# Patient Record
Sex: MALE | Race: WHITE | NOT HISPANIC OR LATINO | URBAN - METROPOLITAN AREA
[De-identification: names, ages, dates, MRNs, and addresses within clinical notes are randomized per-mention and may not be internally consistent; named-entity substitution may affect disease eponyms.]

---

## 2023-11-27 ENCOUNTER — TRANSFERRED RECORDS (OUTPATIENT)
Dept: HEALTH INFORMATION MANAGEMENT | Facility: CLINIC | Age: 38
End: 2023-11-27

## 2024-01-30 ENCOUNTER — TRANSFERRED RECORDS (OUTPATIENT)
Dept: HEALTH INFORMATION MANAGEMENT | Facility: CLINIC | Age: 39
End: 2024-01-30

## 2024-03-04 ENCOUNTER — TRANSFERRED RECORDS (OUTPATIENT)
Dept: HEALTH INFORMATION MANAGEMENT | Facility: CLINIC | Age: 39
End: 2024-03-04

## 2024-03-04 LAB
CREATININE (EXTERNAL): 7.08 MG/DL (ref 0.7–1.3)
POTASSIUM (EXTERNAL): 4.2 MEQ/L (ref 3.4–5)

## 2024-03-20 ENCOUNTER — TRANSFERRED RECORDS (OUTPATIENT)
Dept: HEALTH INFORMATION MANAGEMENT | Facility: CLINIC | Age: 39
End: 2024-03-20

## 2024-04-02 ENCOUNTER — REFERRAL (OUTPATIENT)
Dept: TRANSPLANT | Facility: CLINIC | Age: 39
End: 2024-04-02

## 2024-04-02 DIAGNOSIS — Z99.2 ESRD ON PERITONEAL DIALYSIS (H): ICD-10-CM

## 2024-04-02 DIAGNOSIS — N18.6 ESRD ON PERITONEAL DIALYSIS (H): ICD-10-CM

## 2024-04-02 DIAGNOSIS — Q61.5: Primary | ICD-10-CM

## 2024-04-02 NOTE — LETTER
4/15/2024      Marco Lr  [address] [facility] [address]      Dear Marco,    Thank you for your interest in the Transplant Center at [facility]. We look forward to being a part of your care team and assisting you through the transplant process.    As we discussed, your transplant coordinator is Leti Alcantara (Kidney).  You may call your coordinator at any time with questions or concerns.  Your first scheduled call will be on 4/29 between 9:00-12:00 .  If this needs to change, call [phone].    Please complete the following.    Fill out and return the enclosed forms  Authorization for Electronic Communication  Authorization to Discuss Protected Health Information  Authorization for Release of Protected Health Information    Sign up for:  kapturemt, access to your electronic medical record (see enclosed pamphlet)  [email or web address] [email or web address], a transplant education website              You can use these tools to learn more about your transplant, communicate with your care team, and track your medical details      Sincerely,      [facility] Transplant  [facility]    cc: Referring Physician

## 2024-04-03 PROBLEM — D63.1 ANEMIA OF CHRONIC RENAL FAILURE, STAGE 5 (H): Status: ACTIVE | Noted: 2023-09-01

## 2024-04-03 PROBLEM — N18.6 ESRD ON PERITONEAL DIALYSIS (H): Status: ACTIVE | Noted: 2023-11-29

## 2024-04-03 PROBLEM — Q61.5: Status: ACTIVE | Noted: 2024-02-11

## 2024-04-03 PROBLEM — Z99.2 ESRD ON PERITONEAL DIALYSIS (H): Status: ACTIVE | Noted: 2023-11-29

## 2024-04-03 PROBLEM — N18.5 ANEMIA OF CHRONIC RENAL FAILURE, STAGE 5 (H): Status: ACTIVE | Noted: 2023-09-01

## 2024-04-03 PROBLEM — E11.9 DIABETES MELLITUS, TYPE 2 (H): Status: ACTIVE | Noted: 2024-02-01

## 2024-04-15 VITALS — HEIGHT: 75 IN | BODY MASS INDEX: 31.08 KG/M2 | WEIGHT: 250 LBS

## 2024-04-15 NOTE — TELEPHONE ENCOUNTER
SOT KIDNEY INTAKE     PCP: Jair Mckeon MD   Referring Organization: [facility]  Referring Provider: Christa Cordon MD   Referring Diagnosis: CKD stage 5 on PD    GFR/Date: on PD     Is patient under the age of 65? yes  Is patient diabetic? yes  Is patient on insulin? yes  Was patient offered a pancreas transplant referral? Not interested     Previous transplants no  Cancer history: no  Cardiac history: HTN not on Rx  Biopsy no  Oxygen use at rest: no with activity: no    BMI: 31 (BMI> 40 - RNCC call only/ no PKE date)      Is patient in a group home/assisted living? no  Does patient have a guardian? no    Referral intake process completed.  Patient is aware that after financial approval is received, medical records will be requested.   Patient confirmed for a callback from transplant coordinator on 4/29 . (within 2 weeks)  Tentative evaluation date 7/8/24 slot 3 (within 4 weeks) if appointment is virtual, does patient have capabilities of setting this up? no    Confirmed coordinator will discuss evaluation process in more detail at the time of their call.   Patient is aware of the need to arrange age appropriate cancer screening, vaccinations, and dental care.  Reminded patient to complete questionnaire, complete medical records release, and review packet prior to evaluation visit .  Assessed patient for special needs (ie-wheelchair, assistance, guardian, and ):  no   Patient instructed to call [phone] with questions.     Patient gave verbal consent during intake call to obtain medical records and documents outside of [facility]:  yes

## 2024-04-29 NOTE — TELEPHONE ENCOUNTER
Left VM on 4/29/24    Reviewed pt's chart for pre-kidney transplant evaluation planning. Coordinator first call on 4/29/24. PKE STD on Monday, 7/08/2024 slot #3.      services required: no. Is pt able to attend virtual education class? No -does not have active MyChart    Pt has ESRD secondary medullary cystic kidney disease biopsy unknonw.  Pt is on dialysis, PD started on 9/25/2023. Pt is type 2 diabetic, on insulin.     Health hx: Gout, ADHD, osteochondroma (benign), kidney stones.    Heart hx: HTN.    Lung hx: Hx of smoking.   Surgical hx: intestinal surgery at 6 months old, PD cath placement, appendectomy, tonsillectomy, leg fracture, hernia repair.      Pt is a former smoker - does vape occasionally, does not consume alcohol - pt states quit 14 months ago - however had a positive PETH test on 2/7/24, and does not use recreational drugs. Does * have current infection, * non-healing wounds, or active cancer.    Health maintenance items: BMI 31 on 4/15/24.  Colonoscopy: n/a.  Dental: UTD.  Vaccinations: UTD.     Pt is independent w/ ADLs.  Pt lives in [place], [place] w/ spouse.  Will confirm support following transplant. Pt  living donors.     Imaging Available: CT abd/pelvis w/o IV contrast from 1/2024 requested to be pushed into PACS    Contacted patient and introduced myself as their Transplant Coordinator, also introduced the role of the Transplant Coordinator in the transplant process.  Explained the purpose of this call including reviewing next steps and answering questions.        Will confirm once pt returns call     Confirmed Referring Provider, *; Dialysis Center, [facility]; and Primary Care Physician, *. Explained to the patient of the importance of continued communication with referring providers and primary care physicians.      Reviewed components of transplant evaluation process including necessary appointments, tests, and procedures.  Instructed pt to bring 1-2 people with  them to eval and to eat and drink and normally on eval day    Answered questions for patient regarding evaluation, provided my name and contact information and requested they call/message with any additional questions or concerns.  Informed patient they will receive a letter with information discussed in referral call. Determined that patient would like information regarding transplant by:       Mail, Email, MyChart, and/or Phone Call.  Encouraged the use of "[employer]hart.    Informed pt about transplant educational website, asked to watch pre-kidney or sign up for education class prior to evaluation. Link for educational videos were provided.  Additional informational web sites about transplant were discussed. Links provided to www.unos.org and www.srtr.org in letter sent to patient.  Pt expressed * understanding of all and were in * agreement with the plan.    Confirmed STD *. Informed pt they will hear from scheduling to arrange appointment times for evaluation day. As well as, receive an email from our Office prior to eval with a Receipt of Info and educational materials - instructed to read materials and sign consent prior to eval. Smartset orders entered.